# Patient Record
Sex: MALE | Race: BLACK OR AFRICAN AMERICAN | NOT HISPANIC OR LATINO | Employment: FULL TIME | URBAN - METROPOLITAN AREA
[De-identification: names, ages, dates, MRNs, and addresses within clinical notes are randomized per-mention and may not be internally consistent; named-entity substitution may affect disease eponyms.]

---

## 2023-01-31 ENCOUNTER — HOSPITAL ENCOUNTER (EMERGENCY)
Facility: CLINIC | Age: 48
Discharge: HOME | End: 2023-01-31
Attending: EMERGENCY MEDICINE | Admitting: EMERGENCY MEDICINE
Payer: COMMERCIAL

## 2023-01-31 ENCOUNTER — APPOINTMENT (OUTPATIENT)
Dept: GENERAL RADIOLOGY | Facility: CLINIC | Age: 48
End: 2023-01-31
Attending: EMERGENCY MEDICINE
Payer: COMMERCIAL

## 2023-01-31 VITALS
DIASTOLIC BLOOD PRESSURE: 92 MMHG | OXYGEN SATURATION: 98 % | TEMPERATURE: 98.3 F | HEIGHT: 68 IN | RESPIRATION RATE: 16 BRPM | WEIGHT: 235.23 LBS | BODY MASS INDEX: 35.65 KG/M2 | SYSTOLIC BLOOD PRESSURE: 135 MMHG | HEART RATE: 74 BPM

## 2023-01-31 DIAGNOSIS — R07.9 CHEST PAIN, UNSPECIFIED TYPE: ICD-10-CM

## 2023-01-31 PROBLEM — E78.2 MIXED HYPERLIPIDEMIA: Status: ACTIVE | Noted: 2017-12-08

## 2023-01-31 PROBLEM — N52.9 ERECTILE DYSFUNCTION: Status: ACTIVE | Noted: 2022-01-17

## 2023-01-31 PROBLEM — H35.00 RETINOPATHY: Status: ACTIVE | Noted: 2021-05-24

## 2023-01-31 PROBLEM — E66.811 OBESITY, CLASS I, BMI 30-34.9: Status: ACTIVE | Noted: 2019-12-20

## 2023-01-31 LAB
ALBUMIN SERPL BCG-MCNC: 4.9 G/DL (ref 3.5–5.2)
ALP SERPL-CCNC: 133 U/L (ref 40–129)
ALT SERPL W P-5'-P-CCNC: 32 U/L (ref 10–50)
ANION GAP SERPL CALCULATED.3IONS-SCNC: 12 MMOL/L (ref 7–15)
AST SERPL W P-5'-P-CCNC: 27 U/L (ref 10–50)
ATRIAL RATE - MUSE: 86 BPM
BASOPHILS # BLD AUTO: 0.1 10E3/UL (ref 0–0.2)
BASOPHILS NFR BLD AUTO: 1 %
BILIRUB SERPL-MCNC: 0.8 MG/DL
BUN SERPL-MCNC: 10.7 MG/DL (ref 6–20)
CALCIUM SERPL-MCNC: 9.5 MG/DL (ref 8.6–10)
CHLORIDE SERPL-SCNC: 100 MMOL/L (ref 98–107)
CREAT SERPL-MCNC: 1.1 MG/DL (ref 0.67–1.17)
DEPRECATED HCO3 PLAS-SCNC: 28 MMOL/L (ref 22–29)
DIASTOLIC BLOOD PRESSURE - MUSE: NORMAL MMHG
EOSINOPHIL # BLD AUTO: 0.4 10E3/UL (ref 0–0.7)
EOSINOPHIL NFR BLD AUTO: 3 %
ERYTHROCYTE [DISTWIDTH] IN BLOOD BY AUTOMATED COUNT: 13.2 % (ref 10–15)
GFR SERPL CREATININE-BSD FRML MDRD: 83 ML/MIN/1.73M2
GLUCOSE SERPL-MCNC: 109 MG/DL (ref 70–99)
HCT VFR BLD AUTO: 46.2 % (ref 40–53)
HGB BLD-MCNC: 15.7 G/DL (ref 13.3–17.7)
HOLD SPECIMEN: NORMAL
IMM GRANULOCYTES # BLD: 0.1 10E3/UL
IMM GRANULOCYTES NFR BLD: 0 %
INTERPRETATION ECG - MUSE: NORMAL
LIPASE SERPL-CCNC: 22 U/L (ref 13–60)
LYMPHOCYTES # BLD AUTO: 4.5 10E3/UL (ref 0.8–5.3)
LYMPHOCYTES NFR BLD AUTO: 38 %
MCH RBC QN AUTO: 28.1 PG (ref 26.5–33)
MCHC RBC AUTO-ENTMCNC: 34 G/DL (ref 31.5–36.5)
MCV RBC AUTO: 83 FL (ref 78–100)
MONOCYTES # BLD AUTO: 0.9 10E3/UL (ref 0–1.3)
MONOCYTES NFR BLD AUTO: 7 %
NEUTROPHILS # BLD AUTO: 6 10E3/UL (ref 1.6–8.3)
NEUTROPHILS NFR BLD AUTO: 51 %
NRBC # BLD AUTO: 0 10E3/UL
NRBC BLD AUTO-RTO: 0 /100
P AXIS - MUSE: 48 DEGREES
PLATELET # BLD AUTO: 331 10E3/UL (ref 150–450)
POTASSIUM SERPL-SCNC: 3.5 MMOL/L (ref 3.4–5.3)
PR INTERVAL - MUSE: 152 MS
PROT SERPL-MCNC: 8.4 G/DL (ref 6.4–8.3)
QRS DURATION - MUSE: 82 MS
QT - MUSE: 386 MS
QTC - MUSE: 461 MS
R AXIS - MUSE: 10 DEGREES
RBC # BLD AUTO: 5.59 10E6/UL (ref 4.4–5.9)
SODIUM SERPL-SCNC: 140 MMOL/L (ref 136–145)
SYSTOLIC BLOOD PRESSURE - MUSE: NORMAL MMHG
T AXIS - MUSE: 20 DEGREES
TROPONIN T SERPL HS-MCNC: 7 NG/L
TROPONIN T SERPL HS-MCNC: <6 NG/L
VENTRICULAR RATE- MUSE: 86 BPM
WBC # BLD AUTO: 11.9 10E3/UL (ref 4–11)

## 2023-01-31 PROCEDURE — 85004 AUTOMATED DIFF WBC COUNT: CPT | Performed by: EMERGENCY MEDICINE

## 2023-01-31 PROCEDURE — 99285 EMERGENCY DEPT VISIT HI MDM: CPT | Mod: 25

## 2023-01-31 PROCEDURE — 71046 X-RAY EXAM CHEST 2 VIEWS: CPT

## 2023-01-31 PROCEDURE — 36415 COLL VENOUS BLD VENIPUNCTURE: CPT | Performed by: EMERGENCY MEDICINE

## 2023-01-31 PROCEDURE — 93005 ELECTROCARDIOGRAM TRACING: CPT

## 2023-01-31 PROCEDURE — 80053 COMPREHEN METABOLIC PANEL: CPT | Performed by: EMERGENCY MEDICINE

## 2023-01-31 PROCEDURE — 83690 ASSAY OF LIPASE: CPT | Performed by: EMERGENCY MEDICINE

## 2023-01-31 PROCEDURE — 84484 ASSAY OF TROPONIN QUANT: CPT | Mod: 91 | Performed by: EMERGENCY MEDICINE

## 2023-01-31 PROCEDURE — 84484 ASSAY OF TROPONIN QUANT: CPT | Performed by: EMERGENCY MEDICINE

## 2023-01-31 RX ORDER — SILDENAFIL 100 MG/1
100 TABLET, FILM COATED ORAL
COMMUNITY
Start: 2023-01-19

## 2023-01-31 RX ORDER — TRIAMTERENE AND HYDROCHLOROTHIAZIDE 37.5; 25 MG/1; MG/1
1 CAPSULE ORAL DAILY
COMMUNITY
Start: 2023-01-19 | End: 2024-01-19

## 2023-01-31 RX ORDER — ATORVASTATIN CALCIUM 20 MG/1
20 TABLET, FILM COATED ORAL DAILY
COMMUNITY
Start: 2023-01-19

## 2023-01-31 RX ORDER — AMLODIPINE AND BENAZEPRIL HYDROCHLORIDE 10; 20 MG/1; MG/1
1 CAPSULE ORAL DAILY
COMMUNITY
Start: 2023-01-19

## 2023-01-31 ASSESSMENT — ACTIVITIES OF DAILY LIVING (ADL)
ADLS_ACUITY_SCORE: 35
ADLS_ACUITY_SCORE: 35

## 2023-01-31 NOTE — ED PROVIDER NOTES
"  History     Chief Complaint:  Chest Pain       HPI   Karlo Santos is a 47 year old male with a history of hypertension, hyperlipidemia, prediabetes who presents with intermittent left-sided chest pain.  Is been going on for a while but occurred last night at 930 and again at 130 this morning waking him up.  His wife made him come for evaluation.  He states that he has been working out more and thought initially that he might just be sore.  He has been able to go on the treadmill without any pain in during this activity.  While he was driving here pain radiated to the left arm.  There is been no diaphoresis, nausea, shortness of breath.  He has had no recent fevers or chills.  Recently had his annual wellness check with his primary care provider.  Remote tobacco use.  Father  of congestive heart failure.  Patient does not think that he had a heart attack, stent, or bypass.    Independent Historian: Patient     Review of External Notes: Recent PCP note  with continuation of chronic medications.  Medications and past history imported.    ROS:  Review of Systems  Ten system ROS reviewed and is negative except as above     Allergies:  No Known Allergies     Medications:    amLODIPine-benazepril (LOTREL) 10-20 MG capsule  atorvastatin (LIPITOR) 20 MG tablet  sildenafil (VIAGRA) 100 MG tablet  triamterene-HCTZ (DYAZIDE) 37.5-25 MG capsule        Past Medical History:    Patient Active Problem List   Diagnosis     Erectile dysfunction     Essential hypertension     Mixed hyperlipidemia     Obesity, Class I, BMI 30-34.9     Retinopathy       Physical Exam   Patient Vitals for the past 24 hrs:   BP Temp Temp src Pulse Resp SpO2 Height Weight   23 0322 (!) 145/98 -- -- 86 18 100 % -- --   23 0313 -- -- -- 98 25 99 % -- --   23 0312 (!) 172/106 -- -- 91 23 -- -- --   23 0302 (!) 160/105 98.3  F (36.8  C) Oral 80 22 100 % 1.727 m (5' 8\") 106.7 kg (235 lb 3.7 oz)        Physical " Exam  Eyes:  Sclera white; Pupils are equal and round  ENT:    External ears and nares normal  CV:  Rate as above with regular rhythm, no CW tenderness   Resp:  Breath sounds clear and equal bilaterally    Non-labored, no retractions or accessory muscle use  GI:  Abdomen is soft, non-tender, non-distended    No rebound tenderness or peritoneal features  MS:  Moves all extremities, no peripheral edema  Skin:  Warm and dry  Neuro:  Speech is normal and fluent. No apparent deficit.        Emergency Department Course   ECG  ECG taken at 0308, ECG read at 0312  Normal sinus rhythm, possible left atrial enlargement, borderline ECG.  [facility]  Rate 86 bpm. SC interval 152 ms. QRS duration 82 ms. QT/QTc 386/461 ms.    Imaging:  XR Chest 2 Views   Final Result   IMPRESSION: Negative chest.         Report per radiology    Laboratory:  Labs Ordered and Resulted from Time of ED Arrival to Time of ED Departure   COMPREHENSIVE METABOLIC PANEL - Abnormal       Result Value    Sodium 140      Potassium 3.5      Chloride 100      Carbon Dioxide (CO2) 28      Anion Gap 12      Urea Nitrogen 10.7      Creatinine 1.10      Calcium 9.5      Glucose 109 (*)     Alkaline Phosphatase 133 (*)     AST 27      ALT 32      Protein Total 8.4 (*)     Albumin 4.9      Bilirubin Total 0.8      GFR Estimate 83     CBC WITH PLATELETS AND DIFFERENTIAL - Abnormal    WBC Count 11.9 (*)     RBC Count 5.59      Hemoglobin 15.7      Hematocrit 46.2      MCV 83      MCH 28.1      MCHC 34.0      RDW 13.2      Platelet Count 331      % Neutrophils 51      % Lymphocytes 38      % Monocytes 7      % Eosinophils 3      % Basophils 1      % Immature Granulocytes 0      NRBCs per 100 WBC 0      Absolute Neutrophils 6.0      Absolute Lymphocytes 4.5      Absolute Monocytes 0.9      Absolute Eosinophils 0.4      Absolute Basophils 0.1      Absolute Immature Granulocytes 0.1      Absolute NRBCs 0.0     LIPASE  - Normal    Lipase 22     TROPONIN T, HIGH SENSITIVITY - Normal    Troponin T, High Sensitivity 7     TROPONIN T, HIGH SENSITIVITY - Normal    Troponin T, High Sensitivity <6          Procedures   NA    Emergency Department Course & Assessments:             Interventions:  Medications - No data to display     Independent Interpretation (X-rays, CTs, rhythm strip):  EKG, CXR - agree    Disposition:  The patient was discharged to home.     Impression & Plan      Medical Decision Making:  Karlo Santos is here for evaluation of chest pain not affecting working out on the treadmill.  EKG was obtained immediately and demonstrates no ischemic changes, pre-excitation, pericarditis or dysrhythmia.  Differential includes NSTEMI, pneumonia, pneumothorax, pleural effusion, esophageal spasm, GERD.  PERC negative.  Blood work and CXR reassuring.  No severe electrolyte abnormalities, LEONEL, anemia, or suggestion of occult infection.  HS troponin gender normal then undetectable on recheck; not consistent with ACS.  Patient has a low risk HEART score and is appropriate for outpatient management.     Discussed potential role of stress test given he has risk factors for heart disease; he will discuss with PCP.    Return immediately for worsening or any concern.    Diagnosis:    ICD-10-CM    1. Chest pain, unspecified type  R07.9             Maylin Causey MD  01/31/23 6031